# Patient Record
Sex: FEMALE | Race: WHITE | NOT HISPANIC OR LATINO | Employment: FULL TIME | URBAN - NONMETROPOLITAN AREA
[De-identification: names, ages, dates, MRNs, and addresses within clinical notes are randomized per-mention and may not be internally consistent; named-entity substitution may affect disease eponyms.]

---

## 2017-02-13 RX ORDER — ESTRADIOL 10 UG/1
TABLET VAGINAL
Qty: 8 TABLET | Refills: 11 | Status: SHIPPED | OUTPATIENT
Start: 2017-02-13 | End: 2017-12-26 | Stop reason: SDUPTHER

## 2017-11-14 ENCOUNTER — OFFICE VISIT (OUTPATIENT)
Dept: SURGERY | Facility: CLINIC | Age: 47
End: 2017-11-14

## 2017-11-14 VITALS
BODY MASS INDEX: 40.92 KG/M2 | SYSTOLIC BLOOD PRESSURE: 154 MMHG | DIASTOLIC BLOOD PRESSURE: 82 MMHG | WEIGHT: 203 LBS | TEMPERATURE: 97.8 F | HEART RATE: 97 BPM | HEIGHT: 59 IN | OXYGEN SATURATION: 98 %

## 2017-11-14 DIAGNOSIS — L02.412 CUTANEOUS ABSCESS OF LEFT AXILLA: Primary | ICD-10-CM

## 2017-11-14 PROCEDURE — 99244 OFF/OP CNSLTJ NEW/EST MOD 40: CPT | Performed by: SURGERY

## 2017-11-14 RX ORDER — FLUCONAZOLE 150 MG/1
TABLET ORAL
COMMUNITY
Start: 2017-10-10 | End: 2017-11-14

## 2017-11-14 RX ORDER — ESOMEPRAZOLE MAGNESIUM 40 MG/1
CAPSULE, DELAYED RELEASE ORAL
COMMUNITY
Start: 2017-11-13 | End: 2017-11-14

## 2017-11-14 RX ORDER — FLUCONAZOLE 100 MG/1
100 TABLET ORAL DAILY
Qty: 2 TABLET | Refills: 0 | Status: SHIPPED | OUTPATIENT
Start: 2017-11-14 | End: 2017-11-16

## 2017-11-14 RX ORDER — ATORVASTATIN CALCIUM 10 MG/1
10 TABLET, FILM COATED ORAL DAILY
COMMUNITY
End: 2017-11-29

## 2017-11-14 RX ORDER — METHYLPREDNISOLONE 4 MG/1
TABLET ORAL
COMMUNITY
Start: 2017-09-14 | End: 2017-11-14

## 2017-11-14 RX ORDER — HYDROXYZINE HYDROCHLORIDE 25 MG/1
TABLET, FILM COATED ORAL
COMMUNITY
Start: 2017-09-14 | End: 2017-11-14

## 2017-11-14 RX ORDER — SULFAMETHOXAZOLE AND TRIMETHOPRIM 800; 160 MG/1; MG/1
1 TABLET ORAL 2 TIMES DAILY
Qty: 14 TABLET | Refills: 0 | Status: SHIPPED | OUTPATIENT
Start: 2017-11-14 | End: 2017-11-21

## 2017-11-14 RX ORDER — CARVEDILOL 12.5 MG/1
TABLET ORAL
COMMUNITY
Start: 2017-10-08 | End: 2022-01-03 | Stop reason: DRUGHIGH

## 2017-11-14 RX ORDER — FLUTICASONE PROPIONATE 50 MCG
2 SPRAY, SUSPENSION (ML) NASAL DAILY
COMMUNITY

## 2017-11-14 RX ORDER — RANITIDINE 150 MG/1
TABLET ORAL
COMMUNITY
Start: 2017-10-30 | End: 2017-11-14

## 2017-11-14 RX ORDER — QUINAPRIL 40 MG/1
TABLET ORAL
COMMUNITY
Start: 2017-09-29 | End: 2023-01-05 | Stop reason: ALTCHOICE

## 2017-11-14 NOTE — PROGRESS NOTES
Patient: Dilia Mirza    YOB: 1970    Date: 11/14/2017    Primary Care Provider: Prabha Pemberton MD    Chief complaint:   Chief Complaint   Patient presents with   • Mass     abscess in left axilla       Subjective .     History of present illness:  I saw the patient in the office today for an evaluation and treatment of an abscess in left axilla. She states it has been there for the two weeks, it bust open and started draining on 11/06/17 but states it is no longer draining. She states it was a sharp pain in her left axilla but since the abscess has drained, it is no longer painful. She has had previous hidradenitis excision of the left axilla.  This was complicated by postoperative infection according to the patient.    Review of Systems   Constitutional: Negative for chills, fever and unexpected weight change.   HENT: Negative for hearing loss, trouble swallowing and voice change.    Eyes: Negative for visual disturbance.   Respiratory: Negative for apnea, cough, chest tightness, shortness of breath and wheezing.    Cardiovascular: Negative for chest pain, palpitations and leg swelling.   Gastrointestinal: Negative for abdominal distention, abdominal pain, anal bleeding, blood in stool, constipation, diarrhea, nausea, rectal pain and vomiting.   Endocrine: Negative for cold intolerance and heat intolerance.   Genitourinary: Negative for difficulty urinating, dysuria and flank pain.   Musculoskeletal: Negative for back pain and gait problem.   Skin: Negative for color change, rash and wound.   Neurological: Negative for dizziness, syncope, speech difficulty, weakness, light-headedness, numbness and headaches.   Hematological: Negative for adenopathy. Does not bruise/bleed easily.   Psychiatric/Behavioral: Negative for confusion. The patient is not nervous/anxious.        Allergies:  Allergies   Allergen Reactions   • Ceclor [Cefaclor]    • Sudafed [Pseudoephedrine]    •  "Tetracyclines & Related        Medications:    Current Outpatient Prescriptions:   •  atorvastatin (LIPITOR) 10 MG tablet, Take 10 mg by mouth Daily., Disp: , Rfl:   •  carvedilol (COREG) 12.5 MG tablet, , Disp: , Rfl:   •  fluticasone (FLONASE) 50 MCG/ACT nasal spray, 2 sprays into each nostril Daily., Disp: , Rfl:   •  GLIMEPIRIDE PO, Take  by mouth., Disp: , Rfl:   •  metFORMIN (GLUCOPHAGE) 1000 MG tablet, Take 1,000 mg by mouth 2 (Two) Times a Day With Meals., Disp: , Rfl:   •  ONE TOUCH ULTRA TEST test strip, , Disp: , Rfl:   •  Progesterone 40 % cream, , Disp: , Rfl:   •  quinapril (ACCUPRIL) 40 MG tablet, , Disp: , Rfl:   •  YUVAFEM 10 MCG tablet vaginal tablet, INSERT 1 TABLET VAGINALLY TWO TIMES A WEEK , Disp: 8 tablet, Rfl: 11  •  fluconazole (DIFLUCAN) 100 MG tablet, Take 1 tablet by mouth Daily for 2 days., Disp: 2 tablet, Rfl: 0  •  sulfamethoxazole-trimethoprim (BACTRIM DS) 800-160 MG per tablet, Take 1 tablet by mouth 2 (Two) Times a Day for 7 days., Disp: 14 tablet, Rfl: 0    History\"  Past Medical History:   Diagnosis Date   • Diabetes mellitus    • Hypertension        Past Surgical History:   Procedure Laterality Date   • CYST REMOVAL      left axilla   • HAND SURGERY     • HYSTERECTOMY      2007   • KNEE ARTHROSCOPY W/ MENISCAL REPAIR         Family History   Problem Relation Age of Onset   • Diabetes Mother    • Hypertension Mother    • Diabetes Sister    • Hypertension Sister        Social History   Substance Use Topics   • Smoking status: Never Smoker   • Smokeless tobacco: Never Used   • Alcohol use No        Objective     Vital Signs:   /82  Pulse 97  Temp 97.8 °F (36.6 °C) (Temporal Artery )   Ht 59\" (149.9 cm)  Wt 203 lb (92.1 kg)  SpO2 98%  BMI 41 kg/m2    Physical Exam:   General Appearance:    Alert, cooperative, in no acute distress   Head:    Normocephalic, without obvious abnormality, atraumatic   Eyes:            Lids and lashes normal, conjunctivae and sclerae normal, no "   icterus, no pallor, corneas clear, PERRLA   Ears:    Ears appear intact with no abnormalities noted   Throat:   No oral lesions, no thrush, oral mucosa moist   Neck:   No adenopathy, supple, trachea midline, no thyromegaly, no   carotid bruit, no JVD   Lungs:     Clear to auscultation,respirations regular, even and                  unlabored    Heart:    Regular rhythm and normal rate, normal S1 and S2, no            murmur, no gallop, no rub, no click   Chest Wall:    No abnormalities observed   Abdomen:     Normal bowel sounds, no masses, no organomegaly, soft        non-tender, non-distended, no guarding, no rebound                tenderness   Extremities:   Moves all extremities well, no edema, no cyanosis, no             redness   Pulses:   Pulses palpable and equal bilaterally   Skin:   No bleeding, bruising or rash, there is an area of chronic hidradenitis with no active inflammation noted in the left axilla    Lymph nodes:   No palpable adenopathy   Neurologic:   Cranial nerves 2 - 12 grossly intact, sensation intact, DTR       present and equal bilaterally     Results Review:   I reviewed the patient's new clinical results.  I reviewed the patient's new imaging results and agree with the interpretation.    Assessment/Plan     1. Cutaneous abscess of left axilla        In short, did have a detailed and extensive discussion with the patient and her  in the office today and I do think she has chronic hidradenitis of the left axilla.  She needs to finish her antibiotics and then I would like to see her back in the office in 2 weeks.  She may need future excision of this area but only after the inflammatory tissue resolves.  She clearly understands that there is a risk of recurrent hidradenitis with or without surgical intervention.    I discussed the patients findings and my recommendations with patient and family.    Review of Systems was reviewed and confirmed as accurate today.    Electronically  signed by Jeff Bone MD  11/15/17      Scribed for Jeff Bone MD by Pallavi Gunter. 11/15/2017  5:27 PM

## 2017-11-22 ENCOUNTER — TELEPHONE (OUTPATIENT)
Dept: SURGERY | Facility: CLINIC | Age: 47
End: 2017-11-22

## 2017-11-22 ENCOUNTER — OFFICE VISIT (OUTPATIENT)
Dept: SURGERY | Facility: CLINIC | Age: 47
End: 2017-11-22

## 2017-11-22 VITALS
WEIGHT: 203 LBS | DIASTOLIC BLOOD PRESSURE: 80 MMHG | BODY MASS INDEX: 40.92 KG/M2 | SYSTOLIC BLOOD PRESSURE: 136 MMHG | TEMPERATURE: 98 F | HEART RATE: 104 BPM | OXYGEN SATURATION: 98 % | HEIGHT: 59 IN

## 2017-11-22 DIAGNOSIS — L73.2 HIDRADENITIS AXILLARIS: Primary | ICD-10-CM

## 2017-11-22 PROCEDURE — 99213 OFFICE O/P EST LOW 20 MIN: CPT | Performed by: SURGERY

## 2017-11-22 RX ORDER — AMOXICILLIN AND CLAVULANATE POTASSIUM 875; 125 MG/1; MG/1
1 TABLET, FILM COATED ORAL 2 TIMES DAILY
Qty: 14 TABLET | Refills: 0 | Status: SHIPPED | OUTPATIENT
Start: 2017-11-22 | End: 2017-12-02

## 2017-11-22 NOTE — PROGRESS NOTES
"Patient: Dilia Mirza    YOB: 1970    Date: 11/22/2017    Primary Care Provider: Prabha Pemberton MD    Chief Complaint: left axillary pain    History:  I saw the patient in the office today for an evaluation and treatment of an abscess in left axilla. She states it has been there for the three weeks, it did bust open and drain on 11/06/17 but states it is no longer draining. She states there was a sharp pain in her left axilla but since the abscess has drained, it is no longer painful. She has had previous hidradenitis excision of the left axilla.  This was complicated by postoperative infection according to the patient. She states she did finish her antibiotics and the abscess has decreased in size some. She states it has reopened since her last visit here.    Review of Systems   Constitutional: Negative for chills, fatigue and fever.   Respiratory: Negative for cough.    Cardiovascular: Negative for chest pain.   Gastrointestinal: Negative for abdominal pain, diarrhea, nausea and vomiting.       Vital Signs  /80  Pulse 104  Temp 98 °F (36.7 °C) (Temporal Artery )   Ht 59\" (149.9 cm)  Wt 203 lb (92.1 kg)  SpO2 98%  BMI 41 kg/m2    Allergies:  Allergies   Allergen Reactions   • Ceclor [Cefaclor]    • Sudafed [Pseudoephedrine]    • Tetracyclines & Related        Medications:    Current Outpatient Prescriptions:   •  atorvastatin (LIPITOR) 10 MG tablet, Take 10 mg by mouth Daily., Disp: , Rfl:   •  carvedilol (COREG) 12.5 MG tablet, , Disp: , Rfl:   •  fluticasone (FLONASE) 50 MCG/ACT nasal spray, 2 sprays into each nostril Daily., Disp: , Rfl:   •  GLIMEPIRIDE PO, Take  by mouth., Disp: , Rfl:   •  metFORMIN (GLUCOPHAGE) 1000 MG tablet, Take 1,000 mg by mouth 2 (Two) Times a Day With Meals., Disp: , Rfl:   •  ONE TOUCH ULTRA TEST test strip, , Disp: , Rfl:   •  Progesterone 40 % cream, , Disp: , Rfl:   •  quinapril (ACCUPRIL) 40 MG tablet, , Disp: , Rfl:   •  " YUVAFEM 10 MCG tablet vaginal tablet, INSERT 1 TABLET VAGINALLY TWO TIMES A WEEK , Disp: 8 tablet, Rfl: 11    Physical Exam:   General Appearance:    Alert, cooperative, in no acute distress   Head:    Normocephalic, without obvious abnormality, atraumatic   Lungs:     Clear to auscultation,respirations regular, even and                  unlabored    Heart:    Regular rhythm and normal rate, normal S1 and S2, no            murmur, no gallop, no rub, no click   Abdomen:     Normal bowel sounds, no masses, no organomegaly, soft        non-tender, non-distended, no guarding, no rebound                tenderness   Extremities:   Moves all extremities well, no edema, no cyanosis, no             redness   Pulses:   Pulses palpable and equal bilaterally   Skin:   No bleeding, bruising or rash, there is an area of hidradentitis present in the left axilla      Assessment/Plan     1. Hidradenitis axillaris       I did have a detailed and extensive discussion with the patient in the office today and I think that she is going to ultimately need left axillary excision.  She has had this performed in the past for hidradenitis of the left axilla, she did have some postoperative complications of numbness and paresthesia to the anterior aspect of the left forearm.  This has improved but she wants to try to avoid surgical intervention which I think is a good idea.  I did give her another prescription today for Augmentin, I will see her back in the office in one week.    Electronically signed by Jeff Bone MD  11/22/17    Scribed for Jeff Bone MD by Pallavi Gunter. 11/22/2017  1:54 PM

## 2017-11-29 ENCOUNTER — OFFICE VISIT (OUTPATIENT)
Dept: SURGERY | Facility: CLINIC | Age: 47
End: 2017-11-29

## 2017-11-29 VITALS
DIASTOLIC BLOOD PRESSURE: 80 MMHG | HEIGHT: 59 IN | BODY MASS INDEX: 41.37 KG/M2 | TEMPERATURE: 97.8 F | HEART RATE: 93 BPM | OXYGEN SATURATION: 98 % | SYSTOLIC BLOOD PRESSURE: 130 MMHG | WEIGHT: 205.2 LBS

## 2017-11-29 DIAGNOSIS — L73.2 HIDRADENITIS: Primary | ICD-10-CM

## 2017-11-29 PROCEDURE — 99213 OFFICE O/P EST LOW 20 MIN: CPT | Performed by: SURGERY

## 2017-11-29 RX ORDER — ATORVASTATIN CALCIUM 20 MG/1
TABLET, FILM COATED ORAL
Refills: 1 | COMMUNITY
Start: 2017-11-20 | End: 2020-12-31 | Stop reason: DRUGHIGH

## 2017-11-29 NOTE — PROGRESS NOTES
"Patient: Dilia Mirza    YOB: 1970    Date: 11/29/2017    Primary Care Provider: Prabha Pemberton MD    Chief Complaint:   Chief Complaint   Patient presents with   • Post-op     Patient is here for a post op left axilla hidradenitis       History: Patient is here for a post op left axilla hidradenitis.  Patient states that the area has gone down in size.  Patient states that her arm is very sore and tender to touch.  Patient denies any drainage.  Patient states that she has a spot on her right index finger that she wants looked at also patient states that she has had the area for over a year patient states that the area comes and goes.  Patient denies any pain with the finger or growth.    Review of Systems   Constitutional: Negative for chills, fatigue and fever.   HENT: Negative for trouble swallowing.    Respiratory: Negative for cough and shortness of breath.    Cardiovascular: Negative for chest pain.   Gastrointestinal: Negative for abdominal pain, diarrhea, nausea and vomiting.       Vital Signs  /80  Pulse 93  Temp 97.8 °F (36.6 °C)  Ht 59\" (149.9 cm)  Wt 205 lb 3.2 oz (93.1 kg)  SpO2 98%  BMI 41.45 kg/m2    Allergies:  Allergies   Allergen Reactions   • Ceclor [Cefaclor]    • Sudafed [Pseudoephedrine]    • Tetracyclines & Related        Medications:    Current Outpatient Prescriptions:   •  amoxicillin-clavulanate (AUGMENTIN) 875-125 MG per tablet, Take 1 tablet by mouth 2 (Two) Times a Day for 10 days., Disp: 14 tablet, Rfl: 0  •  atorvastatin (LIPITOR) 20 MG tablet, TAKE 1 TABLET BY MOUTH AT BEDTIME, Disp: , Rfl: 1  •  carvedilol (COREG) 12.5 MG tablet, , Disp: , Rfl:   •  fluticasone (FLONASE) 50 MCG/ACT nasal spray, 2 sprays into each nostril Daily., Disp: , Rfl:   •  GLIMEPIRIDE PO, Take  by mouth., Disp: , Rfl:   •  metFORMIN (GLUCOPHAGE) 1000 MG tablet, Take 1,000 mg by mouth 2 (Two) Times a Day With Meals., Disp: , Rfl:   •  ONE TOUCH ULTRA TEST " test strip, , Disp: , Rfl:   •  Progesterone 40 % cream, , Disp: , Rfl:   •  quinapril (ACCUPRIL) 40 MG tablet, , Disp: , Rfl:   •  YUVAFEM 10 MCG tablet vaginal tablet, INSERT 1 TABLET VAGINALLY TWO TIMES A WEEK , Disp: 8 tablet, Rfl: 11    Physical Exam:   General Appearance:    Alert, cooperative, in no acute distress   Head:    Normocephalic, without obvious abnormality, atraumatic   Lungs:     Clear to auscultation,respirations regular, even and                  unlabored    Heart:    Regular rhythm and normal rate, normal S1 and S2, no            murmur, no gallop, no rub, no click   Abdomen:     Normal bowel sounds, no masses, no organomegaly, soft        non-tender, non-distended, no guarding, no rebound                tenderness   Extremities:   Moves all extremities well, no edema, no cyanosis, no             redness   Pulses:   Pulses palpable and equal bilaterally   Skin:   No bleeding, bruising or rash, area of tenderness and hardness in the left axilla markedly decreased      Assessment/Plan     1. Hidradenitis      I did have a detailed and extensive discussion with the patient in the office today.  I am glad that we did not pursue operative intervention, I have asked her to finish her current antibiotics and I would like to see her back in the office in 3 weeks.  Hopefully she will continue to improve, she understands that there is a possibility of worsening.  If so she needs to call me sooner.    Electronically signed by Jeff Bone MD  11/30/17   Scribed for Jeff Bone MD by Sara May. 11/30/2017  4:23 PM

## 2017-12-11 ENCOUNTER — TRANSCRIBE ORDERS (OUTPATIENT)
Dept: MAMMOGRAPHY | Facility: HOSPITAL | Age: 47
End: 2017-12-11

## 2017-12-11 DIAGNOSIS — Z12.39 BREAST CANCER SCREENING: Primary | ICD-10-CM

## 2017-12-13 ENCOUNTER — OFFICE VISIT (OUTPATIENT)
Dept: SURGERY | Facility: CLINIC | Age: 47
End: 2017-12-13

## 2017-12-13 VITALS
TEMPERATURE: 97.9 F | SYSTOLIC BLOOD PRESSURE: 122 MMHG | OXYGEN SATURATION: 97 % | HEIGHT: 59 IN | DIASTOLIC BLOOD PRESSURE: 78 MMHG | BODY MASS INDEX: 41.33 KG/M2 | HEART RATE: 101 BPM | WEIGHT: 205 LBS

## 2017-12-13 DIAGNOSIS — L73.2 HIDRADENITIS AXILLARIS: Primary | ICD-10-CM

## 2017-12-13 PROCEDURE — 99213 OFFICE O/P EST LOW 20 MIN: CPT | Performed by: SURGERY

## 2017-12-13 NOTE — PROGRESS NOTES
"Patient: Dilia Mirza    YOB: 1970    Date: 12/13/2017    Primary Care Provider: Prabha Pemberton MD    Chief Complaint:   Chief Complaint   Patient presents with   • Follow-up     follow up hidradenitis       History: I saw the patient in the office today for evaluation and treatment of hidradenitis in the left axilla. She states that she finished her anabiotics. She states the area has improved and has not flared up since her last visit. She complains of her left axilla being intermittently tender and sore, does not radiate, does not change with movement. She states Dr. Dueñas prescribed her an antiinflammatory drug but she has not yet started it.     The following portions of the patient's history were reviewed and updated as appropriate: allergies, current medications, past family history, past medical history, past social history, past surgical history and problem list.      Review of Systems   Constitutional: Negative for chills, fatigue and fever.   Respiratory: Negative for cough.    Cardiovascular: Negative for chest pain.   Gastrointestinal: Negative for abdominal pain, diarrhea, nausea and vomiting.       Vital Signs  /78  Pulse 101  Temp 97.9 °F (36.6 °C) (Temporal Artery )   Ht 149.9 cm (59.02\")  Wt 93 kg (205 lb)  SpO2 97%  BMI 41.38 kg/m2    Allergies:  Allergies   Allergen Reactions   • Ceclor [Cefaclor]    • Sudafed [Pseudoephedrine]    • Tetracyclines & Related        Medications:    Current Outpatient Prescriptions:   •  atorvastatin (LIPITOR) 20 MG tablet, TAKE 1 TABLET BY MOUTH AT BEDTIME, Disp: , Rfl: 1  •  carvedilol (COREG) 12.5 MG tablet, , Disp: , Rfl:   •  fluticasone (FLONASE) 50 MCG/ACT nasal spray, 2 sprays into each nostril Daily., Disp: , Rfl:   •  GLIMEPIRIDE PO, Take  by mouth., Disp: , Rfl:   •  metFORMIN (GLUCOPHAGE) 1000 MG tablet, Take 1,000 mg by mouth 2 (Two) Times a Day With Meals., Disp: , Rfl:   •  ONE TOUCH ULTRA TEST " test strip, , Disp: , Rfl:   •  Progesterone 40 % cream, , Disp: , Rfl:   •  quinapril (ACCUPRIL) 40 MG tablet, , Disp: , Rfl:   •  YUVAFEM 10 MCG tablet vaginal tablet, INSERT 1 TABLET VAGINALLY TWO TIMES A WEEK , Disp: 8 tablet, Rfl: 11    Physical Exam:   General Appearance:    Alert, cooperative, in no acute distress   Head:    Normocephalic, without obvious abnormality, atraumatic   Lungs:     Clear to auscultation,respirations regular, even and                  unlabored    Heart:    Regular rhythm and normal rate, normal S1 and S2, no            murmur, no gallop, no rub, no click   Abdomen:     Normal bowel sounds, no masses, no organomegaly, soft        non-tender, non-distended, no guarding, no rebound                tenderness   Extremities:   Moves all extremities well, no edema, no cyanosis, no             redness   Pulses:   Pulses palpable and equal bilaterally   Skin:   No bleeding, bruising or rash, area under the left axilla resolved       Results Review:   I reviewed the patient's new clinical results.  I reviewed the patient's new imaging results and agree with the interpretation.     Assessment/Plan     1. Hidradenitis axillaris      I did discuss the situation with the patient today and she does not want surgery and I don't blame her.  I will see her back only if she has further problems.    Electronically signed by Jeff Bone MD  12/13/17  Scribed for Jeff Bone MD by Pallavi Gunter. 12/13/2017  4:49 PM

## 2017-12-26 ENCOUNTER — OFFICE VISIT (OUTPATIENT)
Dept: OBSTETRICS AND GYNECOLOGY | Facility: CLINIC | Age: 47
End: 2017-12-26

## 2017-12-26 VITALS
HEIGHT: 59 IN | WEIGHT: 202 LBS | BODY MASS INDEX: 40.72 KG/M2 | DIASTOLIC BLOOD PRESSURE: 74 MMHG | SYSTOLIC BLOOD PRESSURE: 128 MMHG

## 2017-12-26 DIAGNOSIS — Z78.0 POSTMENOPAUSAL: ICD-10-CM

## 2017-12-26 DIAGNOSIS — Z12.72 SPECIAL SCREENING FOR MALIGNANT NEOPLASM OF VAGINA: ICD-10-CM

## 2017-12-26 DIAGNOSIS — Z01.419 ENCOUNTER FOR GYNECOLOGICAL EXAMINATION WITHOUT ABNORMAL FINDING: Primary | ICD-10-CM

## 2017-12-26 PROCEDURE — 99396 PREV VISIT EST AGE 40-64: CPT | Performed by: PHYSICIAN ASSISTANT

## 2017-12-26 RX ORDER — ESTRADIOL 10 UG/1
1 INSERT VAGINAL 2 TIMES WEEKLY
Qty: 8 TABLET | Refills: 12 | Status: SHIPPED | OUTPATIENT
Start: 2017-12-28 | End: 2018-12-27 | Stop reason: SDUPTHER

## 2017-12-26 NOTE — PROGRESS NOTES
Subjective   Chief Complaint   Patient presents with   • Gynecologic Exam     Previous pap and mammogram done in 2016.       Dilia Mirza is a 47 y.o. year old No obstetric history on file. presenting to be seen for her annual gynecological exam.   She has no complaints  Previous hysterectomy B[facility] 2007-was on evamist until about 2 years ago  She is taking calcium and vit D daily  Has mammogram scheduled [facility] in 2 weeks  Desires refills vagifem   Uses progesterone cream over the counter    Past Medical History:   Diagnosis Date   • Diabetes mellitus    • Hypertension         Current Outpatient Prescriptions:   •  atorvastatin (LIPITOR) 20 MG tablet, TAKE 1 TABLET BY MOUTH AT BEDTIME, Disp: , Rfl: 1  •  carvedilol (COREG) 12.5 MG tablet, , Disp: , Rfl:   •  [START ON 12/28/2017] estradiol (YUVAFEM) 10 MCG tablet vaginal tablet, Insert 1 tablet into the vagina 2 (Two) Times a Week., Disp: 8 tablet, Rfl: 12  •  fluticasone (FLONASE) 50 MCG/ACT nasal spray, 2 sprays into each nostril Daily., Disp: , Rfl:   •  GLIMEPIRIDE PO, Take  by mouth., Disp: , Rfl:   •  metFORMIN (GLUCOPHAGE) 1000 MG tablet, Take 1,000 mg by mouth 2 (Two) Times a Day With Meals., Disp: , Rfl:   •  ONE TOUCH ULTRA TEST test strip, , Disp: , Rfl:   •  Progesterone 40 % cream, , Disp: , Rfl:   •  quinapril (ACCUPRIL) 40 MG tablet, , Disp: , Rfl:    Allergies   Allergen Reactions   • Ceclor [Cefaclor]    • Sudafed [Pseudoephedrine]    • Tetracyclines & Related       Past Surgical History:   Procedure Laterality Date   • CYST REMOVAL      left axilla   • HAND SURGERY     • HYSTERECTOMY      2007   • KNEE ARTHROSCOPY W/ MENISCAL REPAIR        Social History     Social History   • Marital status:      Spouse name: N/A   • Number of children: N/A   • Years of education: N/A     Occupational History   • Not on file.     Social History Main Topics   • Smoking status: Never Smoker   • Smokeless tobacco: Never Used   • Alcohol use No   •  "Drug use: No   • Sexual activity: Defer     Other Topics Concern   • Not on file     Social History Narrative      Family History   Problem Relation Age of Onset   • Diabetes Mother    • Hypertension Mother    • Diabetes Sister    • Hypertension Sister        Review of Systems   Constitutional: Negative.    Gastrointestinal: Negative.    Genitourinary: Negative.            Objective   /74  Ht 149.9 cm (59.02\")  Wt 91.6 kg (202 lb)  BMI 40.78 kg/m2    Physical Exam   Constitutional: She appears well-developed and well-nourished. She is cooperative.   Pulmonary/Chest: Right breast exhibits no inverted nipple, no mass, no nipple discharge, no skin change and no tenderness. Left breast exhibits no inverted nipple, no mass, no nipple discharge, no skin change and no tenderness.   Abdominal: Soft. Normal appearance. There is no hepatosplenomegaly. There is no tenderness.   Genitourinary: Vagina normal and uterus normal. There is no tenderness or lesion on the right labia. There is no tenderness or lesion on the left labia. Cervix exhibits no motion tenderness and no discharge. Right adnexum displays no mass and no tenderness. Left adnexum displays no mass and no tenderness.   Neurological: She is alert.   Skin: Skin is warm and dry.   Psychiatric: She has a normal mood and affect. Her behavior is normal.            Assessment and Plan  Dilia was seen today for gynecologic exam.    Diagnoses and all orders for this visit:    Encounter for gynecological examination without abnormal finding    Special screening for malignant neoplasm of vagina  -     Liquid-based Pap Smear, Screening - ThinPrep Vial, Cervix; Future    Postmenopausal    Other orders  -     estradiol (YUVAFEM) 10 MCG tablet vaginal tablet; Insert 1 tablet into the vagina 2 (Two) Times a Week.      Patient Instructions   Self breast exam monthly  Annual mammogram  Calcium 1200 mg daily and vit D 2000 mg daily  Regular excercise             This note " was electronically signed.    Berta Ortega PA-C   December 26, 2017

## 2018-01-05 DIAGNOSIS — Z12.72 SPECIAL SCREENING FOR MALIGNANT NEOPLASM OF VAGINA: ICD-10-CM

## 2018-01-12 ENCOUNTER — HOSPITAL ENCOUNTER (OUTPATIENT)
Dept: MAMMOGRAPHY | Facility: HOSPITAL | Age: 48
Discharge: HOME | End: 2018-01-12
Admitting: PHYSICIAN ASSISTANT

## 2018-01-12 DIAGNOSIS — Z12.39 BREAST CANCER SCREENING: ICD-10-CM

## 2018-01-12 PROCEDURE — 77067 SCR MAMMO BI INCL CAD: CPT

## 2018-01-12 PROCEDURE — 77063 BREAST TOMOSYNTHESIS BI: CPT

## 2018-01-16 DIAGNOSIS — R92.8 ABNORMALITY OF RIGHT BREAST ON SCREENING MAMMOGRAM: Primary | ICD-10-CM

## 2018-02-06 ENCOUNTER — HOSPITAL ENCOUNTER (OUTPATIENT)
Dept: MAMMOGRAPHY | Facility: HOSPITAL | Age: 48
Discharge: HOME | End: 2018-02-06
Admitting: PHYSICIAN ASSISTANT

## 2018-02-06 ENCOUNTER — HOSPITAL ENCOUNTER (OUTPATIENT)
Dept: ULTRASOUND IMAGING | Facility: HOSPITAL | Age: 48
Discharge: HOME | End: 2018-02-06

## 2018-02-06 DIAGNOSIS — R92.8 ABNORMALITY OF RIGHT BREAST ON SCREENING MAMMOGRAM: ICD-10-CM

## 2018-02-06 PROCEDURE — 76642 ULTRASOUND BREAST LIMITED: CPT

## 2018-02-06 PROCEDURE — G0279 TOMOSYNTHESIS, MAMMO: HCPCS

## 2018-02-06 PROCEDURE — 77065 DX MAMMO INCL CAD UNI: CPT

## 2018-08-30 ENCOUNTER — OFFICE VISIT (OUTPATIENT)
Dept: OBSTETRICS AND GYNECOLOGY | Facility: CLINIC | Age: 48
End: 2018-08-30

## 2018-08-30 VITALS
DIASTOLIC BLOOD PRESSURE: 72 MMHG | WEIGHT: 197.6 LBS | BODY MASS INDEX: 39.84 KG/M2 | SYSTOLIC BLOOD PRESSURE: 116 MMHG | HEIGHT: 59 IN

## 2018-08-30 DIAGNOSIS — N64.4 BREAST PAIN, RIGHT: Primary | ICD-10-CM

## 2018-08-30 PROCEDURE — 99213 OFFICE O/P EST LOW 20 MIN: CPT | Performed by: PHYSICIAN ASSISTANT

## 2018-08-30 RX ORDER — ESTRADIOL 10 UG/1
INSERT VAGINAL
COMMUNITY
End: 2020-11-25

## 2018-08-30 RX ORDER — ESOMEPRAZOLE MAGNESIUM 40 MG/1
CAPSULE, DELAYED RELEASE ORAL
Refills: 0 | COMMUNITY
Start: 2018-08-01

## 2018-08-30 RX ORDER — LORATADINE 10 MG/1
10 TABLET ORAL DAILY
Refills: 0 | COMMUNITY
Start: 2018-06-14 | End: 2020-12-31

## 2018-08-30 RX ORDER — METFORMIN HYDROCHLORIDE 500 MG/1
TABLET, EXTENDED RELEASE ORAL
Refills: 2 | COMMUNITY
Start: 2018-08-04

## 2018-08-30 RX ORDER — ASCORBIC ACID
CRYSTALS ORAL
COMMUNITY

## 2018-08-30 RX ORDER — MULTIVIT-MIN/IRON/FOLIC ACID/K 18-600-40
CAPSULE ORAL
COMMUNITY

## 2018-08-30 RX ORDER — ATORVASTATIN CALCIUM 40 MG/1
40 TABLET, FILM COATED ORAL DAILY
Refills: 1 | COMMUNITY
Start: 2018-06-01 | End: 2020-12-31 | Stop reason: SDUPTHER

## 2018-08-30 RX ORDER — RANITIDINE 150 MG/1
150 TABLET ORAL 2 TIMES DAILY
Refills: 0 | COMMUNITY
Start: 2018-08-01 | End: 2020-12-31

## 2018-08-30 RX ORDER — GLIMEPIRIDE 1 MG/1
TABLET ORAL
Refills: 2 | COMMUNITY
Start: 2018-08-27 | End: 2020-12-31 | Stop reason: SDUPTHER

## 2018-12-27 ENCOUNTER — OFFICE VISIT (OUTPATIENT)
Dept: OBSTETRICS AND GYNECOLOGY | Facility: CLINIC | Age: 48
End: 2018-12-27

## 2018-12-27 VITALS
SYSTOLIC BLOOD PRESSURE: 128 MMHG | HEIGHT: 60 IN | BODY MASS INDEX: 40.37 KG/M2 | WEIGHT: 205.6 LBS | DIASTOLIC BLOOD PRESSURE: 78 MMHG

## 2018-12-27 DIAGNOSIS — Z12.72 SPECIAL SCREENING FOR MALIGNANT NEOPLASM OF VAGINA: ICD-10-CM

## 2018-12-27 DIAGNOSIS — Z01.419 ENCOUNTER FOR GYNECOLOGICAL EXAMINATION WITHOUT ABNORMAL FINDING: Primary | ICD-10-CM

## 2018-12-27 DIAGNOSIS — N95.1 MENOPAUSAL STATE: ICD-10-CM

## 2018-12-27 PROCEDURE — 99396 PREV VISIT EST AGE 40-64: CPT | Performed by: PHYSICIAN ASSISTANT

## 2018-12-27 RX ORDER — ATORVASTATIN CALCIUM 40 MG/1
TABLET, FILM COATED ORAL
COMMUNITY

## 2018-12-27 RX ORDER — ESTRADIOL 10 UG/1
1 INSERT VAGINAL 2 TIMES WEEKLY
Qty: 8 TABLET | Refills: 12 | Status: SHIPPED | OUTPATIENT
Start: 2018-12-27 | End: 2020-01-07

## 2018-12-27 RX ORDER — BLOOD-GLUCOSE METER
KIT MISCELLANEOUS
Refills: 0 | COMMUNITY
Start: 2018-10-04

## 2018-12-27 RX ORDER — ESTRADIOL 2 MG/1
2 TABLET ORAL DAILY
Qty: 30 TABLET | Refills: 11 | Status: SHIPPED | OUTPATIENT
Start: 2018-12-27 | End: 2018-12-27

## 2018-12-27 NOTE — PROGRESS NOTES
Subjective   Chief Complaint   Patient presents with   • Gynecologic Exam     Pap is due; MMG is due in January; No complaints       Dilia Mirza is a 48 y.o. year old  presenting to be seen for her annual gynecological exam.   She has no complaints or concerns  Desires refills Yuvafem  Previous hysterectomy BSO  Screening mammogram is due in January    Past Medical History:   Diagnosis Date   • Diabetes mellitus (CMS/HCC)    • Hypertension         Current Outpatient Medications:   •  Adalimumab (HUMIRA) 10 MG/0.1ML Prefilled Syringe Kit, Humira(CF) 10 mg/0.1 mL subcutaneous syringe kit  Inject by subcutaneous route., Disp: , Rfl:   •  Ascorbic Acid (VITAMIN C) 500 MG capsule, Vitamin C  1 daily, Disp: , Rfl:   •  atorvastatin (LIPITOR) 20 MG tablet, TAKE 1 TABLET BY MOUTH AT BEDTIME, Disp: , Rfl: 1  •  Blood Glucose Monitoring Suppl (ONE TOUCH ULTRA MINI) w/Device kit, USE TO CHECK BLOOD GLUCOSE ONCE DAILY OR AS DIRECTED, Disp: , Rfl: 0  •  carvedilol (COREG) 12.5 MG tablet, , Disp: , Rfl:   •  Cholecalciferol (VITAMIN D3) 1000 UNIT/SPRAY liquid, Vitamin D3  Daily, Disp: , Rfl:   •  esomeprazole (nexIUM) 40 MG capsule, TAKE 1 CAPSULE BY MOUTH ONE TIME A DAY, Disp: , Rfl: 0  •  estradiol (VAGIFEM) 10 MCG tablet vaginal tablet, Vagifem 10 mcg vaginal tablet  As Directed, Disp: , Rfl:   •  estradiol (YUVAFEM) 10 MCG tablet vaginal tablet, Insert 1 tablet into the vagina 2 (Two) Times a Week., Disp: 8 tablet, Rfl: 12  •  fluticasone (FLONASE) 50 MCG/ACT nasal spray, 2 sprays into each nostril Daily., Disp: , Rfl:   •  glimepiride (AMARYL) 1 MG tablet, TAKE 1/2 TABLET BY MOUTH IN THE MORNING, Disp: , Rfl: 2  •  GLIMEPIRIDE PO, Take  by mouth., Disp: , Rfl:   •  glucose blood test strip, OneTouch Ultra Blue Test Strip  1 time daily E11.8, Disp: , Rfl:   •  metFORMIN (GLUCOPHAGE) 1000 MG tablet, Take 1,000 mg by mouth 2 (Two) Times a Day With Meals., Disp: , Rfl:   •  ONE TOUCH ULTRA TEST test strip,  , Disp: , Rfl:   •  Progesterone 40 % cream, , Disp: , Rfl:   •  Progesterone Micronized (PROGESTERONE PO), progesterone  As Directed, Disp: , Rfl:   •  quinapril (ACCUPRIL) 40 MG tablet, , Disp: , Rfl:   •  Vitamin E 200 units tablet, vitamin E  Daily, Disp: , Rfl:   •  atorvastatin (LIPITOR) 40 MG tablet, Take 40 mg by mouth Daily. 200001, Disp: , Rfl: 1  •  atorvastatin (LIPITOR) 40 MG tablet, atorvastatin 40 mg tablet  Take 1 tablet every day by oral route., Disp: , Rfl:   •  B Complex Vitamins (GNP VITAMIN B COMPLEX PO), vitamin B complex  1 daily, Disp: , Rfl:   •  Dermatological Products, Misc. (ALEVICYN ANTIPRURITIC EX), Alevicyn Antipruritic  prn, Disp: , Rfl:   •  loratadine (CLARITIN) 10 MG tablet, Take 10 mg by mouth Daily. 200001, Disp: , Rfl: 0  •  metFORMIN ER (GLUCOPHAGE-XR) 500 MG 24 hr tablet, TAKE 2 TABLETS BY MOUTH TWO TIMES A DAY in the morning and evening, Disp: , Rfl: 2  •  raNITIdine (ZANTAC) 150 MG tablet, Take 150 mg by mouth 2 (Two) Times a Day., Disp: , Rfl: 0   Allergies   Allergen Reactions   • Ceclor [Cefaclor] Anaphylaxis   • Tetracycline Hives   • Tetracyclines & Related Anaphylaxis   • Sudafed [Pseudoephedrine] Palpitations      Past Surgical History:   Procedure Laterality Date   • CYST REMOVAL      left axilla   • HAND SURGERY     • HYSTERECTOMY      2007   • KNEE ARTHROSCOPY W/ MENISCAL REPAIR     • OOPHORECTOMY     • TOTAL ABDOMINAL HYSTERECTOMY        Social History     Socioeconomic History   • Marital status:      Spouse name: Not on file   • Number of children: Not on file   • Years of education: Not on file   • Highest education level: Not on file   Social Needs   • Financial resource strain: Not on file   • Food insecurity - worry: Not on file   • Food insecurity - inability: Not on file   • Transportation needs - medical: Not on file   • Transportation needs - non-medical: Not on file   Occupational History   • Not on file   Tobacco Use   • Smoking status: Never  "Smoker   • Smokeless tobacco: Never Used   Substance and Sexual Activity   • Alcohol use: No   • Drug use: No   • Sexual activity: Yes     Partners: Male     Birth control/protection: Surgical   Other Topics Concern   • Not on file   Social History Narrative   • Not on file      Family History   Problem Relation Age of Onset   • Diabetes Mother    • Hypertension Mother    • Diabetes Sister    • Hypertension Sister        Review of Systems   Constitutional: Negative.    Gastrointestinal: Negative.    Genitourinary: Negative.            Objective   /78   Ht 152.4 cm (60\")   Wt 93.3 kg (205 lb 9.6 oz)   Breastfeeding? No   BMI 40.15 kg/m²     Physical Exam   Constitutional: She appears well-developed and well-nourished. She is cooperative.   Pulmonary/Chest: Right breast exhibits no inverted nipple, no mass, no nipple discharge, no skin change and no tenderness. Left breast exhibits no inverted nipple, no mass, no nipple discharge, no skin change and no tenderness.   Abdominal: Soft. Normal appearance. There is no tenderness.   Genitourinary: Vagina normal. There is no tenderness or lesion on the right labia. There is no tenderness or lesion on the left labia. Right adnexum displays no mass and no tenderness. Left adnexum displays no mass and no tenderness.   Genitourinary Comments: Pap done   Neurological: She is alert.   Skin: Skin is warm and dry.   Psychiatric: She has a normal mood and affect. Her behavior is normal.            Assessment and Plan  Dilia was seen today for gynecologic exam.    Diagnoses and all orders for this visit:    Encounter for gynecological examination without abnormal finding  -     Mammo Screening Digital Tomosynthesis Bilateral With CAD; Future    Special screening for malignant neoplasm of vagina  -     Liquid-based Pap Smear, Screening; Future    Menopausal state    Other orders  -     estradiol (YUVAFEM) 10 MCG tablet vaginal tablet; Insert 1 tablet into the vagina 2 (Two) " Times a Week.  -     Discontinue: estradiol (ESTRACE) 2 MG tablet; Take 1 tablet by mouth Daily.      Patient Instructions   Self breast exam monthly  Annual mammogram  Calcium 1200 mg daily and vit D 2000 mg daily  Regular excercise             This note was electronically signed.    Berta Ortega PA-C   December 27, 2018

## 2019-01-07 DIAGNOSIS — Z12.72 SPECIAL SCREENING FOR MALIGNANT NEOPLASM OF VAGINA: ICD-10-CM

## 2019-02-08 ENCOUNTER — HOSPITAL ENCOUNTER (OUTPATIENT)
Dept: MAMMOGRAPHY | Facility: HOSPITAL | Age: 49
Discharge: HOME | End: 2019-02-08
Admitting: PHYSICIAN ASSISTANT

## 2019-02-08 DIAGNOSIS — Z01.419 ENCOUNTER FOR GYNECOLOGICAL EXAMINATION WITHOUT ABNORMAL FINDING: ICD-10-CM

## 2019-02-08 PROCEDURE — 77067 SCR MAMMO BI INCL CAD: CPT

## 2019-02-08 PROCEDURE — 77063 BREAST TOMOSYNTHESIS BI: CPT

## 2019-02-11 ENCOUNTER — TELEPHONE (OUTPATIENT)
Dept: OBSTETRICS AND GYNECOLOGY | Facility: CLINIC | Age: 49
End: 2019-02-11

## 2019-02-11 DIAGNOSIS — R92.8 ABNORMAL MAMMOGRAM: Primary | ICD-10-CM

## 2019-02-11 NOTE — TELEPHONE ENCOUNTER
----- Message from Berta Ortega PA-C sent at 2/9/2019  8:37 AM EST -----  Please check to see if patient was scheduled for additional views. Thanks

## 2019-02-14 ENCOUNTER — HOSPITAL ENCOUNTER (OUTPATIENT)
Dept: MAMMOGRAPHY | Facility: HOSPITAL | Age: 49
Discharge: HOME | End: 2019-02-14
Admitting: PHYSICIAN ASSISTANT

## 2019-02-14 DIAGNOSIS — R92.8 ABNORMAL MAMMOGRAM: ICD-10-CM

## 2019-02-14 PROCEDURE — 77065 DX MAMMO INCL CAD UNI: CPT

## 2019-02-14 PROCEDURE — G0279 TOMOSYNTHESIS, MAMMO: HCPCS

## 2020-01-07 RX ORDER — ESTRADIOL 10 UG/1
INSERT VAGINAL
Qty: 8 TABLET | Refills: 11 | Status: SHIPPED | OUTPATIENT
Start: 2020-01-07 | End: 2020-12-31 | Stop reason: SDUPTHER

## 2020-01-27 ENCOUNTER — TRANSCRIBE ORDERS (OUTPATIENT)
Dept: ADMINISTRATIVE | Facility: HOSPITAL | Age: 50
End: 2020-01-27

## 2020-01-27 DIAGNOSIS — Z12.31 BREAST CANCER SCREENING BY MAMMOGRAM: Primary | ICD-10-CM

## 2020-03-10 ENCOUNTER — APPOINTMENT (OUTPATIENT)
Dept: MAMMOGRAPHY | Facility: HOSPITAL | Age: 50
End: 2020-03-10

## 2020-04-06 ENCOUNTER — APPOINTMENT (OUTPATIENT)
Dept: MAMMOGRAPHY | Facility: HOSPITAL | Age: 50
End: 2020-04-06

## 2020-06-03 ENCOUNTER — TRANSCRIBE ORDERS (OUTPATIENT)
Dept: LAB | Facility: HOSPITAL | Age: 50
End: 2020-06-03

## 2020-06-03 ENCOUNTER — LAB (OUTPATIENT)
Dept: LAB | Facility: HOSPITAL | Age: 50
End: 2020-06-03

## 2020-06-03 DIAGNOSIS — L40.0 PSORIASIS VULGARIS: Primary | ICD-10-CM

## 2020-06-03 DIAGNOSIS — L40.1 PSEUDOPSORIASIS, PUSTULAR: ICD-10-CM

## 2020-06-03 DIAGNOSIS — L40.0 PSORIASIS VULGARIS: ICD-10-CM

## 2020-06-03 LAB
ALBUMIN SERPL-MCNC: 4.3 G/DL (ref 3.5–5.2)
ALBUMIN/GLOB SERPL: 1.7 G/DL
ALP SERPL-CCNC: 65 U/L (ref 39–117)
ALT SERPL W P-5'-P-CCNC: 34 U/L (ref 1–33)
ANION GAP SERPL CALCULATED.3IONS-SCNC: 11.5 MMOL/L (ref 5–15)
AST SERPL-CCNC: 25 U/L (ref 1–32)
BASOPHILS # BLD AUTO: 0.03 10*3/MM3 (ref 0–0.2)
BASOPHILS NFR BLD AUTO: 0.8 % (ref 0–1.5)
BILIRUB CONJ SERPL-MCNC: <0.2 MG/DL (ref 0.2–0.3)
BILIRUB SERPL-MCNC: 0.3 MG/DL (ref 0.2–1.2)
BUN BLD-MCNC: 17 MG/DL (ref 6–20)
BUN/CREAT SERPL: 35.4 (ref 7–25)
CALCIUM SPEC-SCNC: 9.2 MG/DL (ref 8.6–10.5)
CHLORIDE SERPL-SCNC: 102 MMOL/L (ref 98–107)
CO2 SERPL-SCNC: 23.5 MMOL/L (ref 22–29)
CREAT BLD-MCNC: 0.48 MG/DL (ref 0.57–1)
DEPRECATED RDW RBC AUTO: 44.7 FL (ref 37–54)
EOSINOPHIL # BLD AUTO: 0.13 10*3/MM3 (ref 0–0.4)
EOSINOPHIL NFR BLD AUTO: 3.4 % (ref 0.3–6.2)
ERYTHROCYTE [DISTWIDTH] IN BLOOD BY AUTOMATED COUNT: 13.8 % (ref 12.3–15.4)
GFR SERPL CREATININE-BSD FRML MDRD: 136 ML/MIN/1.73
GLOBULIN UR ELPH-MCNC: 2.6 GM/DL
GLUCOSE BLD-MCNC: 190 MG/DL (ref 65–99)
HCT VFR BLD AUTO: 36.4 % (ref 34–46.6)
HGB BLD-MCNC: 12.3 G/DL (ref 12–15.9)
IMM GRANULOCYTES # BLD AUTO: 0 10*3/MM3 (ref 0–0.05)
IMM GRANULOCYTES NFR BLD AUTO: 0 % (ref 0–0.5)
LYMPHOCYTES # BLD AUTO: 1.22 10*3/MM3 (ref 0.7–3.1)
LYMPHOCYTES NFR BLD AUTO: 32.2 % (ref 19.6–45.3)
MCH RBC QN AUTO: 29.9 PG (ref 26.6–33)
MCHC RBC AUTO-ENTMCNC: 33.8 G/DL (ref 31.5–35.7)
MCV RBC AUTO: 88.6 FL (ref 79–97)
MONOCYTES # BLD AUTO: 0.25 10*3/MM3 (ref 0.1–0.9)
MONOCYTES NFR BLD AUTO: 6.6 % (ref 5–12)
NEUTROPHILS # BLD AUTO: 2.16 10*3/MM3 (ref 1.7–7)
NEUTROPHILS NFR BLD AUTO: 57 % (ref 42.7–76)
NRBC BLD AUTO-RTO: 0 /100 WBC (ref 0–0.2)
PLATELET # BLD AUTO: 168 10*3/MM3 (ref 140–450)
PMV BLD AUTO: 11.4 FL (ref 6–12)
POTASSIUM BLD-SCNC: 3.9 MMOL/L (ref 3.5–5.2)
PROT SERPL-MCNC: 6.9 G/DL (ref 6–8.5)
RBC # BLD AUTO: 4.11 10*6/MM3 (ref 3.77–5.28)
SODIUM BLD-SCNC: 137 MMOL/L (ref 136–145)
WBC NRBC COR # BLD: 3.79 10*3/MM3 (ref 3.4–10.8)

## 2020-06-03 PROCEDURE — 85025 COMPLETE CBC W/AUTO DIFF WBC: CPT

## 2020-06-03 PROCEDURE — 36415 COLL VENOUS BLD VENIPUNCTURE: CPT

## 2020-06-03 PROCEDURE — 80053 COMPREHEN METABOLIC PANEL: CPT

## 2020-06-03 PROCEDURE — 82248 BILIRUBIN DIRECT: CPT

## 2020-11-18 RX ORDER — ESTRADIOL 10 UG/1
INSERT VAGINAL
Qty: 8 TABLET | Refills: 0 | OUTPATIENT
Start: 2020-11-18

## 2020-11-25 RX ORDER — ESTRADIOL 10 UG/1
1 INSERT VAGINAL 2 TIMES WEEKLY
Qty: 8 TABLET | Refills: 0 | Status: SHIPPED | OUTPATIENT
Start: 2020-11-26 | End: 2020-12-31 | Stop reason: SDUPTHER

## 2020-11-25 NOTE — TELEPHONE ENCOUNTER
----- Message from Jessica Patel sent at 11/25/2020  9:18 AM EST -----  Regarding: REFILL REQUEST  Contact: PT  PT IS SCHEDULED FOR ANNUAL WITH GIAN[facility]ANCO ON 12/31/20.  PLEASE SEND REFILLS OF VAGIFEM TO [facility].  THANKS

## 2020-12-31 ENCOUNTER — OFFICE VISIT (OUTPATIENT)
Dept: OBSTETRICS AND GYNECOLOGY | Facility: CLINIC | Age: 50
End: 2020-12-31

## 2020-12-31 VITALS
BODY MASS INDEX: 37.85 KG/M2 | SYSTOLIC BLOOD PRESSURE: 120 MMHG | DIASTOLIC BLOOD PRESSURE: 70 MMHG | WEIGHT: 192.8 LBS | HEIGHT: 60 IN

## 2020-12-31 DIAGNOSIS — Z12.31 ENCOUNTER FOR SCREENING MAMMOGRAM FOR MALIGNANT NEOPLASM OF BREAST: ICD-10-CM

## 2020-12-31 DIAGNOSIS — Z12.72 VAGINAL PAP SMEAR: ICD-10-CM

## 2020-12-31 DIAGNOSIS — Z01.419 ENCOUNTER FOR GYNECOLOGICAL EXAMINATION WITHOUT ABNORMAL FINDING: Primary | ICD-10-CM

## 2020-12-31 PROCEDURE — 99396 PREV VISIT EST AGE 40-64: CPT | Performed by: PHYSICIAN ASSISTANT

## 2020-12-31 RX ORDER — RISANKIZUMAB-RZAA 75 MG/0.83
KIT SUBCUTANEOUS
COMMUNITY
Start: 2020-09-28 | End: 2022-01-03

## 2020-12-31 RX ORDER — DULAGLUTIDE 1.5 MG/.5ML
INJECTION, SOLUTION SUBCUTANEOUS
COMMUNITY
Start: 2020-12-27

## 2020-12-31 RX ORDER — GLIMEPIRIDE 1 MG/1
TABLET ORAL
COMMUNITY
End: 2020-12-31 | Stop reason: ALTCHOICE

## 2020-12-31 RX ORDER — ESTRADIOL 10 UG/1
1 INSERT VAGINAL 2 TIMES WEEKLY
Qty: 8 TABLET | Refills: 12 | Status: SHIPPED | OUTPATIENT
Start: 2020-12-31 | End: 2021-05-03 | Stop reason: SDUPTHER

## 2020-12-31 NOTE — PROGRESS NOTES
Subjective   Chief Complaint   Patient presents with   • Gynecologic Exam     Last pap done 18-WNL, MMG is due, No complaints       Dilia Mirza is a 50 y.o. year old  presenting to be seen for her annual gynecological exam.   She has no complaints or concerns.  She desires refills on her Vagifem.  She has had previous hysterectomy and bilateral salpingo-oophorectomy.  Her last screening mammogram was 2 years ago and she would like to schedule this at [facility].    Past Medical History:   Diagnosis Date   • Diabetes mellitus (CMS/HCC)    • Hypertension         Current Outpatient Medications:   •  Ascorbic Acid (VITAMIN C) 500 MG capsule, Vitamin C  1 daily, Disp: , Rfl:   •  atorvastatin (LIPITOR) 40 MG tablet, atorvastatin 40 mg tablet  Take 1 tablet every day by oral route., Disp: , Rfl:   •  B Complex Vitamins (GNP VITAMIN B COMPLEX PO), vitamin B complex  1 daily, Disp: , Rfl:   •  Blood Glucose Monitoring Suppl (ONE TOUCH ULTRA MINI) w/Device kit, USE TO CHECK BLOOD GLUCOSE ONCE DAILY OR AS DIRECTED, Disp: , Rfl: 0  •  carvedilol (COREG) 12.5 MG tablet, , Disp: , Rfl:   •  Cholecalciferol (VITAMIN D3) 1000 UNIT/SPRAY liquid, Vitamin D3  Daily, Disp: , Rfl:   •  Dermatological Products, Misc. (ALEVICYN ANTIPRURITIC EX), Alevicyn Antipruritic  prn, Disp: , Rfl:   •  esomeprazole (nexIUM) 40 MG capsule, TAKE 1 CAPSULE BY MOUTH ONE TIME A DAY, Disp: , Rfl: 0  •  estradiol (Vagifem) 10 MCG tablet vaginal tablet, Insert 1 tablet into the vagina 2 (Two) Times a Week., Disp: 8 tablet, Rfl: 12  •  fluticasone (FLONASE) 50 MCG/ACT nasal spray, 2 sprays into each nostril Daily., Disp: , Rfl:   •  glucose blood test strip, OneTouch Ultra Blue Test Strip  1 time daily E11.8, Disp: , Rfl:   •  metFORMIN (GLUCOPHAGE) 1000 MG tablet, Take 1,000 mg by mouth 2 (Two) Times a Day With Meals., Disp: , Rfl:   •  metFORMIN ER (GLUCOPHAGE-XR) 500 MG 24 hr tablet, TAKE 2 TABLETS BY MOUTH TWO  "TIMES A DAY in the morning and evening, Disp: , Rfl: 2  •  ONE TOUCH ULTRA TEST test strip, , Disp: , Rfl:   •  Progesterone 40 % cream, , Disp: , Rfl:   •  quinapril (ACCUPRIL) 40 MG tablet, , Disp: , Rfl:   •  Skyrizi, 150 MG Dose, 75 MG/0.83ML Prefilled Syringe Kit kit, , Disp: , Rfl:   •  Trulicity 1.5 MG/0.5ML solution pen-injector, INJECT THE CONTENTS OF 1 SYRINGE (1.5MG) ONE TIME A WEEK AS DIRECTED., Disp: , Rfl:   •  Vitamin E 200 units tablet, vitamin E  Daily, Disp: , Rfl:    Allergies   Allergen Reactions   • Ceclor [Cefaclor] Anaphylaxis   • Tetracycline Hives   • Tetracyclines & Related Anaphylaxis   • Sudafed [Pseudoephedrine] Palpitations      Past Surgical History:   Procedure Laterality Date   • CYST REMOVAL      left axilla   • HAND SURGERY     • HYSTERECTOMY      2007   • KNEE ARTHROSCOPY W/ MENISCAL REPAIR     • OOPHORECTOMY     • TOTAL ABDOMINAL HYSTERECTOMY        Social History     Socioeconomic History   • Marital status:      Spouse name: Not on file   • Number of children: Not on file   • Years of education: Not on file   • Highest education level: Not on file   Tobacco Use   • Smoking status: Never Smoker   • Smokeless tobacco: Never Used   Substance and Sexual Activity   • Alcohol use: No   • Drug use: No   • Sexual activity: Yes     Partners: Male     Birth control/protection: Surgical      Family History   Problem Relation Age of Onset   • Diabetes Mother    • Hypertension Mother    • Diabetes Sister    • Hypertension Sister        Review of Systems   Constitutional: Negative for chills, diaphoresis and fever.   Gastrointestinal: Negative for abdominal pain, constipation, diarrhea, nausea and vomiting.   Genitourinary: Negative for difficulty urinating, dysuria, menstrual problem, pelvic pain, vaginal bleeding and vaginal discharge.   All other systems reviewed and are negative.          Objective   /70   Ht 152.4 cm (60\")   Wt 87.5 kg (192 lb 12.8 oz)   Breastfeeding No "   BMI 37.65 kg/m²     Physical Exam  Constitutional:       Appearance: Normal appearance. She is well-groomed. She is obese.   Eyes:      General: Lids are normal.      Extraocular Movements: Extraocular movements intact.      Conjunctiva/sclera: Conjunctivae normal.   Chest:      Breasts: Breasts are symmetrical.         Right: No inverted nipple, mass, nipple discharge, skin change or tenderness.         Left: No inverted nipple, mass, nipple discharge, skin change or tenderness.   Abdominal:      Palpations: Abdomen is soft.      Tenderness: There is no abdominal tenderness. There is no guarding.   Genitourinary:     Labia:         Right: No rash, tenderness or lesion.         Left: No rash, tenderness or lesion.       Vagina: No vaginal discharge, erythema, tenderness, bleeding or lesions.      Uterus: Absent.       Adnexa:         Right: No mass or tenderness.          Left: No mass or tenderness.        Comments: Pap done  Musculoskeletal: Normal range of motion.   Skin:     General: Skin is warm and dry.      Findings: No lesion or rash.   Neurological:      General: No focal deficit present.      Mental Status: She is alert and oriented to person, place, and time.   Psychiatric:         Attention and Perception: Attention normal.         Mood and Affect: Mood normal.         Speech: Speech normal.         Behavior: Behavior is cooperative.         Thought Content: Thought content normal.              Assessment and Plan  Diagnoses and all orders for this visit:    1. Encounter for gynecological examination without abnormal finding (Primary)    2. Vaginal Pap smear  -     Liquid-based Pap Smear, Screening; Future    3. Encounter for screening mammogram for malignant neoplasm of breast  -     Mammo Screening Digital Tomosynthesis Bilateral With CAD; Future    Other orders  -     estradiol (Vagifem) 10 MCG tablet vaginal tablet; Insert 1 tablet into the vagina 2 (Two) Times a Week.  Dispense: 8 tablet; Refill:  12      Patient Instructions   Self breast exam monthly  Annual mammogram  Calcium 1200 mg daily and vit D 2000 mg daily  Regular excercise             This note was electronically signed.    Berta Ortega PA-C   December 31, 2020

## 2021-01-08 DIAGNOSIS — Z12.72 VAGINAL PAP SMEAR: ICD-10-CM

## 2021-01-08 RX ORDER — FLUCONAZOLE 150 MG/1
TABLET ORAL
Qty: 2 TABLET | Refills: 0 | Status: SHIPPED | OUTPATIENT
Start: 2021-01-08 | End: 2022-01-03

## 2021-02-16 ENCOUNTER — HOSPITAL ENCOUNTER (OUTPATIENT)
Dept: MAMMOGRAPHY | Facility: HOSPITAL | Age: 51
End: 2021-02-16

## 2021-03-31 ENCOUNTER — APPOINTMENT (OUTPATIENT)
Dept: MAMMOGRAPHY | Facility: HOSPITAL | Age: 51
End: 2021-03-31

## 2021-04-07 ENCOUNTER — TRANSCRIBE ORDERS (OUTPATIENT)
Dept: GENERAL RADIOLOGY | Facility: HOSPITAL | Age: 51
End: 2021-04-07

## 2021-04-07 ENCOUNTER — HOSPITAL ENCOUNTER (OUTPATIENT)
Dept: GENERAL RADIOLOGY | Facility: HOSPITAL | Age: 51
Discharge: HOME | End: 2021-04-07
Admitting: NURSE PRACTITIONER

## 2021-04-07 DIAGNOSIS — G62.9 ACQUIRED POLYNEUROPATHY: ICD-10-CM

## 2021-04-07 DIAGNOSIS — M25.551 RIGHT HIP PAIN: ICD-10-CM

## 2021-04-07 DIAGNOSIS — M25.552 LEFT HIP PAIN: Primary | ICD-10-CM

## 2021-04-07 DIAGNOSIS — M25.552 LEFT HIP PAIN: ICD-10-CM

## 2021-04-07 PROCEDURE — 73522 X-RAY EXAM HIPS BI 3-4 VIEWS: CPT

## 2021-04-07 PROCEDURE — 72100 X-RAY EXAM L-S SPINE 2/3 VWS: CPT

## 2021-05-03 ENCOUNTER — TELEPHONE (OUTPATIENT)
Dept: OBSTETRICS AND GYNECOLOGY | Facility: CLINIC | Age: 51
End: 2021-05-03

## 2021-05-03 RX ORDER — ESTRADIOL 10 UG/1
1 INSERT VAGINAL 2 TIMES WEEKLY
Qty: 8 TABLET | Refills: 12 | Status: SHIPPED | OUTPATIENT
Start: 2021-05-03 | End: 2022-01-03 | Stop reason: SDUPTHER

## 2021-05-03 NOTE — TELEPHONE ENCOUNTER
----- Message from Lisa Bridges sent at 5/3/2021 11:58 AM EDT -----  Patient stated her insurance is no longer covering the estradiol, she is asking if vagifem can be sent to the [facility]?

## 2021-05-21 ENCOUNTER — HOSPITAL ENCOUNTER (OUTPATIENT)
Dept: MAMMOGRAPHY | Facility: HOSPITAL | Age: 51
Discharge: HOME | End: 2021-05-21
Admitting: PHYSICIAN ASSISTANT

## 2021-05-21 DIAGNOSIS — Z12.31 ENCOUNTER FOR SCREENING MAMMOGRAM FOR MALIGNANT NEOPLASM OF BREAST: ICD-10-CM

## 2021-05-21 PROCEDURE — 77067 SCR MAMMO BI INCL CAD: CPT

## 2021-05-21 PROCEDURE — 77063 BREAST TOMOSYNTHESIS BI: CPT

## 2021-06-29 ENCOUNTER — HOSPITAL ENCOUNTER (OUTPATIENT)
Dept: GENERAL RADIOLOGY | Facility: HOSPITAL | Age: 51
Discharge: HOME | End: 2021-06-29
Admitting: NURSE PRACTITIONER

## 2021-06-29 ENCOUNTER — TRANSCRIBE ORDERS (OUTPATIENT)
Dept: GENERAL RADIOLOGY | Facility: HOSPITAL | Age: 51
End: 2021-06-29

## 2021-06-29 DIAGNOSIS — Z91.81 PERSONAL HISTORY OF FALL: ICD-10-CM

## 2021-06-29 DIAGNOSIS — M25.552 LEFT HIP PAIN: Primary | ICD-10-CM

## 2021-06-29 DIAGNOSIS — M25.552 LEFT HIP PAIN: ICD-10-CM

## 2021-06-29 PROCEDURE — 73502 X-RAY EXAM HIP UNI 2-3 VIEWS: CPT

## 2022-01-03 ENCOUNTER — OFFICE VISIT (OUTPATIENT)
Dept: OBSTETRICS AND GYNECOLOGY | Facility: CLINIC | Age: 52
End: 2022-01-03

## 2022-01-03 VITALS
BODY MASS INDEX: 37.93 KG/M2 | HEIGHT: 60 IN | DIASTOLIC BLOOD PRESSURE: 70 MMHG | SYSTOLIC BLOOD PRESSURE: 132 MMHG | WEIGHT: 193.2 LBS

## 2022-01-03 DIAGNOSIS — Z01.419 ENCOUNTER FOR GYNECOLOGICAL EXAMINATION WITHOUT ABNORMAL FINDING: Primary | ICD-10-CM

## 2022-01-03 PROCEDURE — 99396 PREV VISIT EST AGE 40-64: CPT | Performed by: PHYSICIAN ASSISTANT

## 2022-01-03 RX ORDER — CARVEDILOL 25 MG/1
25 TABLET ORAL 2 TIMES DAILY
COMMUNITY
Start: 2021-12-21

## 2022-01-03 RX ORDER — LEVOCETIRIZINE DIHYDROCHLORIDE 5 MG/1
5 TABLET, FILM COATED ORAL DAILY
COMMUNITY
Start: 2021-11-20

## 2022-01-03 RX ORDER — LEFLUNOMIDE 10 MG/1
TABLET ORAL
COMMUNITY
End: 2023-01-05

## 2022-01-03 RX ORDER — ESTRADIOL 10 UG/1
1 INSERT VAGINAL 2 TIMES WEEKLY
Qty: 24 TABLET | Refills: 4 | Status: SHIPPED | OUTPATIENT
Start: 2022-01-03 | End: 2023-01-05 | Stop reason: SDUPTHER

## 2022-01-03 RX ORDER — DULOXETIN HYDROCHLORIDE 60 MG/1
CAPSULE, DELAYED RELEASE ORAL
COMMUNITY
End: 2023-01-05

## 2022-01-03 RX ORDER — USTEKINUMAB 45 MG/.5ML
45 INJECTION, SOLUTION SUBCUTANEOUS
COMMUNITY

## 2022-01-03 NOTE — PROGRESS NOTES
Subjective   Chief Complaint   Patient presents with   • Gynecologic Exam     No pap today, last pap done 20-WNL, MMG done 21-WNL, No complaints       Dilia Mirza is a 51 y.o. year old  presenting to be seen for her annual gynecological exam.   She has no complaints or concerns  Desires refills on her Vagifem  Had a normal screening mammogram May 2021  Previous hysterectomy and bilateral salpingo-oophorectomy    Past Medical History:   Diagnosis Date   • Diabetes mellitus (HCC)    • Hypertension         Current Outpatient Medications:   •  Ascorbic Acid (VITAMIN C) 500 MG capsule, Vitamin C  1 daily, Disp: , Rfl:   •  atorvastatin (LIPITOR) 40 MG tablet, atorvastatin 40 mg tablet  Take 1 tablet every day by oral route., Disp: , Rfl:   •  Blood Glucose Monitoring Suppl (ONE TOUCH ULTRA MINI) w/Device kit, USE TO CHECK BLOOD GLUCOSE ONCE DAILY OR AS DIRECTED, Disp: , Rfl: 0  •  carvedilol (COREG) 25 MG tablet, Take 25 mg by mouth 2 (Two) Times a Day., Disp: , Rfl:   •  Cholecalciferol (VITAMIN D3) 1000 UNIT/SPRAY liquid, Vitamin D3  Daily, Disp: , Rfl:   •  DULoxetine (CYMBALTA) 60 MG capsule, duloxetine 60 mg capsule,delayed release  Take 1 capsule every day by oral route., Disp: , Rfl:   •  esomeprazole (nexIUM) 40 MG capsule, TAKE 1 CAPSULE BY MOUTH ONE TIME A DAY, Disp: , Rfl: 0  •  estradiol (Vagifem) 10 MCG tablet vaginal tablet, Insert 1 tablet into the vagina 2 (Two) Times a Week., Disp: 24 tablet, Rfl: 4  •  fluticasone (FLONASE) 50 MCG/ACT nasal spray, 2 sprays into each nostril Daily., Disp: , Rfl:   •  glucose blood test strip, OneTouch Ultra Blue Test Strip  1 time daily E11.8, Disp: , Rfl:   •  leflunomide (ARAVA) 10 MG tablet, leflunomide 10 mg tablet  Take 1 tablet every day by oral route., Disp: , Rfl:   •  levocetirizine (XYZAL) 5 MG tablet, Take 5 mg by mouth Daily., Disp: , Rfl:   •  metFORMIN ER (GLUCOPHAGE-XR) 500 MG 24 hr tablet, TAKE 2 TABLETS BY MOUTH TWO TIMES  "A DAY in the morning and evening, Disp: , Rfl: 2  •  ONE TOUCH ULTRA TEST test strip, , Disp: , Rfl:   •  quinapril (ACCUPRIL) 40 MG tablet, , Disp: , Rfl:   •  Trulicity 1.5 MG/0.5ML solution pen-injector, INJECT THE CONTENTS OF 1 SYRINGE (1.5MG) ONE TIME A WEEK AS DIRECTED., Disp: , Rfl:   •  ustekinumab (Stelara) 45 MG/0.5ML injection, Inject 45 mg under the skin into the appropriate area as directed Every 3 (Three) Months., Disp: , Rfl:   •  B Complex Vitamins (GNP VITAMIN B COMPLEX PO), vitamin B complex  1 daily, Disp: , Rfl:   •  Dermatological Products, Misc. (ALEVICYN ANTIPRURITIC EX), Alevicyn Antipruritic  prn, Disp: , Rfl:   •  Progesterone 40 % cream, , Disp: , Rfl:   •  Vitamin E 200 units tablet, vitamin E  Daily, Disp: , Rfl:    Allergies   Allergen Reactions   • Ceclor [Cefaclor] Anaphylaxis   • Tetracycline Hives   • Tetracyclines & Related Anaphylaxis   • Sudafed [Pseudoephedrine] Palpitations      Past Surgical History:   Procedure Laterality Date   • CYST REMOVAL      left axilla   • HAND SURGERY     • HYSTERECTOMY      2007   • KNEE ARTHROSCOPY W/ MENISCAL REPAIR     • OOPHORECTOMY     • TOTAL ABDOMINAL HYSTERECTOMY        Social History     Socioeconomic History   • Marital status:    Tobacco Use   • Smoking status: Never Smoker   • Smokeless tobacco: Never Used   Vaping Use   • Vaping Use: Never used   Substance and Sexual Activity   • Alcohol use: No   • Drug use: No   • Sexual activity: Yes     Partners: Male     Birth control/protection: Surgical      Family History   Problem Relation Age of Onset   • Diabetes Mother    • Hypertension Mother    • Diabetes Sister    • Hypertension Sister        Review of Systems   Constitutional: Negative for chills, diaphoresis and fever.   Gastrointestinal: Negative.    Genitourinary: Negative for difficulty urinating, dysuria and pelvic pain.           Objective   /70   Ht 152.4 cm (60\")   Wt 87.6 kg (193 lb 3.2 oz)   Breastfeeding No   " BMI 37.73 kg/m²     Physical Exam  Constitutional:       Appearance: Normal appearance. She is well-developed and well-groomed. She is morbidly obese.   Eyes:      General: Lids are normal.      Extraocular Movements: Extraocular movements intact.      Conjunctiva/sclera: Conjunctivae normal.   Chest:   Breasts: Breasts are symmetrical.      Right: No inverted nipple, mass, nipple discharge, skin change or tenderness.      Left: No inverted nipple, mass, nipple discharge, skin change or tenderness.       Abdominal:      Palpations: Abdomen is soft.      Tenderness: There is no abdominal tenderness. There is no guarding.   Genitourinary:     Labia:         Right: No rash, tenderness or lesion.         Left: No rash, tenderness or lesion.       Urethra: No prolapse, urethral pain, urethral swelling or urethral lesion.      Vagina: No vaginal discharge, tenderness or lesions.      Uterus: Absent.       Adnexa:         Right: No mass or tenderness.          Left: No mass or tenderness.     Skin:     General: Skin is warm and dry.      Findings: No bruising or lesion.   Neurological:      Mental Status: She is alert.   Psychiatric:         Attention and Perception: Attention normal.         Mood and Affect: Mood normal.         Speech: Speech normal.         Behavior: Behavior is cooperative.            Result Review :                   Assessment and Plan  Diagnoses and all orders for this visit:    1. Encounter for gynecological examination without abnormal finding (Primary)    Other orders  -     estradiol (Vagifem) 10 MCG tablet vaginal tablet; Insert 1 tablet into the vagina 2 (Two) Times a Week.  Dispense: 24 tablet; Refill: 4      Patient Instructions   Self breast exam monthly  Annual mammogram  Calcium 1200 mg daily and vit D 2000 mg daily  Regular excercise               This note was electronically signed.    Berta Ortega PA-C   January 3, 2022

## 2022-05-16 ENCOUNTER — TRANSCRIBE ORDERS (OUTPATIENT)
Dept: GENERAL RADIOLOGY | Facility: HOSPITAL | Age: 52
End: 2022-05-16

## 2022-05-16 ENCOUNTER — HOSPITAL ENCOUNTER (OUTPATIENT)
Dept: GENERAL RADIOLOGY | Facility: HOSPITAL | Age: 52
Discharge: HOME | End: 2022-05-16

## 2022-05-16 ENCOUNTER — LAB (OUTPATIENT)
Dept: LAB | Facility: HOSPITAL | Age: 52
End: 2022-05-16

## 2022-05-16 ENCOUNTER — TRANSCRIBE ORDERS (OUTPATIENT)
Dept: LAB | Facility: HOSPITAL | Age: 52
End: 2022-05-16

## 2022-05-16 DIAGNOSIS — J43.9 EMPHYSEMATOUS BLEB: Primary | ICD-10-CM

## 2022-05-16 DIAGNOSIS — L40.0 PSORIASIS VULGARIS: Primary | ICD-10-CM

## 2022-05-16 DIAGNOSIS — Z79.899 DRUG THERAPY: ICD-10-CM

## 2022-05-16 DIAGNOSIS — L40.0 PSORIASIS VULGARIS: ICD-10-CM

## 2022-05-16 DIAGNOSIS — L40.1 PUSTULAR PSORIASIS: ICD-10-CM

## 2022-05-16 DIAGNOSIS — J43.9 EMPHYSEMATOUS BLEB: ICD-10-CM

## 2022-05-16 PROCEDURE — 86480 TB TEST CELL IMMUN MEASURE: CPT

## 2022-05-16 PROCEDURE — 36415 COLL VENOUS BLD VENIPUNCTURE: CPT

## 2022-05-16 PROCEDURE — 71046 X-RAY EXAM CHEST 2 VIEWS: CPT

## 2022-05-18 LAB
GAMMA INTERFERON BACKGROUND BLD IA-ACNC: 0.01 IU/ML
M TB IFN-G BLD-IMP: NEGATIVE
M TB IFN-G CD4+ BCKGRND COR BLD-ACNC: 0.02 IU/ML
M TB IFN-G CD4+CD8+ BCKGRND COR BLD-ACNC: 0.01 IU/ML
MITOGEN IGNF BLD-ACNC: >10 IU/ML
QUANTIFERON INCUBATION: NORMAL
SERVICE CMNT-IMP: NORMAL

## 2023-01-05 ENCOUNTER — OFFICE VISIT (OUTPATIENT)
Dept: OBSTETRICS AND GYNECOLOGY | Facility: CLINIC | Age: 53
End: 2023-01-05
Payer: COMMERCIAL

## 2023-01-05 VITALS
DIASTOLIC BLOOD PRESSURE: 70 MMHG | WEIGHT: 185.4 LBS | HEIGHT: 60 IN | BODY MASS INDEX: 36.4 KG/M2 | SYSTOLIC BLOOD PRESSURE: 106 MMHG

## 2023-01-05 DIAGNOSIS — Z12.72 VAGINAL PAP SMEAR: ICD-10-CM

## 2023-01-05 DIAGNOSIS — Z01.419 WELL WOMAN EXAM WITH ROUTINE GYNECOLOGICAL EXAM: Primary | ICD-10-CM

## 2023-01-05 PROCEDURE — 99396 PREV VISIT EST AGE 40-64: CPT | Performed by: PHYSICIAN ASSISTANT

## 2023-01-05 RX ORDER — ESTRADIOL 10 UG/1
1 INSERT VAGINAL 3 TIMES WEEKLY
Qty: 36 TABLET | Refills: 4 | Status: SHIPPED | OUTPATIENT
Start: 2023-01-05

## 2023-01-05 RX ORDER — DAPAGLIFLOZIN 10 MG/1
1 TABLET, FILM COATED ORAL DAILY
COMMUNITY
Start: 2022-12-08

## 2023-01-05 RX ORDER — LISINOPRIL 40 MG/1
40 TABLET ORAL DAILY
COMMUNITY
Start: 2022-12-09

## 2023-01-05 NOTE — PROGRESS NOTES
Subjective   Chief Complaint   Patient presents with   • Gynecologic Exam     Last pap done 20-WNL, MMG is due, C/O occasional vaginal pain       Dilia BossTiffanieMichael is a 52 y.o. year old  presenting to be seen for her annual gynecological exam.   Has no complaints except occasional pain/dryness. Using vagifem twice weekly. Prefers to stick with that over a vaginal cream  Last mammogram was may 2021  Previous hysterectomy [facility]SO    Past Medical History:   Diagnosis Date   • Diabetes mellitus (HCC)    • Fibroid    • Hyperlipidemia    • Hypertension    • Migraine    • Ovarian cyst    • Pelvic prolapse     Possible now   • Polycystic ovary syndrome    • Varicella     Mild case        Current Outpatient Medications:   •  Ascorbic Acid (VITAMIN C) 500 MG capsule, Vitamin C  1 daily, Disp: , Rfl:   •  atorvastatin (LIPITOR) 40 MG tablet, atorvastatin 40 mg tablet  Take 1 tablet every day by oral route., Disp: , Rfl:   •  B Complex Vitamins (GNP VITAMIN B COMPLEX PO), vitamin B complex  1 daily, Disp: , Rfl:   •  Blood Glucose Monitoring Suppl (ONE TOUCH ULTRA MINI) w/Device kit, USE TO CHECK BLOOD GLUCOSE ONCE DAILY OR AS DIRECTED, Disp: , Rfl: 0  •  carvedilol (COREG) 25 MG tablet, Take 25 mg by mouth 2 (Two) Times a Day., Disp: , Rfl:   •  Cholecalciferol (VITAMIN D3) 1000 UNIT/SPRAY liquid, Vitamin D3  Daily, Disp: , Rfl:   •  Dermatological Products, Misc. (ALEVICYN ANTIPRURITIC EX), Alevicyn Antipruritic  prn, Disp: , Rfl:   •  esomeprazole (nexIUM) 40 MG capsule, TAKE 1 CAPSULE BY MOUTH ONE TIME A DAY, Disp: , Rfl: 0  •  estradiol (Vagifem) 10 MCG tablet vaginal tablet, Insert 1 tablet into the vagina 3 (Three) Times a Week., Disp: 36 tablet, Rfl: 4  •  Farxiga 10 MG tablet, Take 1 tablet by mouth Daily., Disp: , Rfl:   •  fluticasone (FLONASE) 50 MCG/ACT nasal spray, 2 sprays into each nostril Daily., Disp: , Rfl:   •  glucose blood test strip, OneTouch Ultra Blue Test Strip  1  time daily E11.8, Disp: , Rfl:   •  levocetirizine (XYZAL) 5 MG tablet, Take 5 mg by mouth Daily., Disp: , Rfl:   •  lisinopril (PRINIVIL,ZESTRIL) 40 MG tablet, Take 40 mg by mouth Daily., Disp: , Rfl:   •  metFORMIN ER (GLUCOPHAGE-XR) 500 MG 24 hr tablet, TAKE 2 TABLETS BY MOUTH TWO TIMES A DAY in the morning and evening, Disp: , Rfl: 2  •  ONE TOUCH ULTRA TEST test strip, , Disp: , Rfl:   •  Progesterone 40 % cream, , Disp: , Rfl:   •  Trulicity 1.5 MG/0.5ML solution pen-injector, INJECT THE CONTENTS OF 1 SYRINGE (1.5MG) ONE TIME A WEEK AS DIRECTED., Disp: , Rfl:   •  ustekinumab (Stelara) 45 MG/0.5ML injection, Inject 45 mg under the skin into the appropriate area as directed Every 3 (Three) Months., Disp: , Rfl:   •  Vitamin E 200 units tablet, vitamin E  Daily, Disp: , Rfl:    Allergies   Allergen Reactions   • Ceclor [Cefaclor] Anaphylaxis   • Tetracycline Hives   • Tetracyclines & Related Anaphylaxis   • Sudafed [Pseudoephedrine] Palpitations      Past Surgical History:   Procedure Laterality Date   • CYST REMOVAL      left axilla   • HAND SURGERY     • HYSTERECTOMY      2007   • KNEE ARTHROSCOPY W/ MENISCAL REPAIR     • MYOMECTOMY ABDOMINAL APPROACH  2000   • OOPHORECTOMY     • TOTAL ABDOMINAL HYSTERECTOMY     • TOTAL ABDOMINAL HYSTERECTOMY WITH SALPINGO OOPHORECTOMY  2007   • WISDOM TOOTH EXTRACTION      Unsure of date. After 2008      Social History     Socioeconomic History   • Marital status:    Tobacco Use   • Smoking status: Never   • Smokeless tobacco: Never   Vaping Use   • Vaping Use: Never used   Substance and Sexual Activity   • Alcohol use: Yes     Comment: Rarely. Maybe 2 drinks a month.   • Drug use: No   • Sexual activity: Not Currently     Partners: Male     Birth control/protection: Surgical, Hysterectomy      Family History   Problem Relation Age of Onset   • Diabetes Mother         Type 2   • Hypertension Mother    • Diabetes Sister    • Hypertension Sister    • Diabetes Brother          Type 2       Review of Systems   Constitutional: Negative for chills, diaphoresis and fever.   Gastrointestinal: Negative.    Genitourinary: Positive for vaginal pain. Negative for difficulty urinating, dysuria, pelvic pain, vaginal bleeding and vaginal discharge.           Objective   /70   Ht 152.4 cm (60\")   Wt 84.1 kg (185 lb 6.4 oz)   BMI 36.21 kg/m²     Physical Exam  Exam conducted with a chaperone present.   Constitutional:       Appearance: Normal appearance. She is well-groomed.   Eyes:      General: Lids are normal.      Extraocular Movements: Extraocular movements intact.      Conjunctiva/sclera: Conjunctivae normal.   Chest:   Breasts:     Breasts are symmetrical.      Right: No inverted nipple, mass, nipple discharge, skin change or tenderness.      Left: No inverted nipple, mass, nipple discharge, skin change or tenderness.   Abdominal:      General: There is no distension.      Palpations: Abdomen is soft.      Tenderness: There is no abdominal tenderness.   Genitourinary:     Labia:         Right: No rash, tenderness or lesion.         Left: No rash, tenderness or lesion.       Urethra: No prolapse, urethral pain, urethral swelling or urethral lesion.      Vagina: No vaginal discharge, tenderness or lesions.      Uterus: Absent.       Adnexa:         Right: No mass or tenderness.          Left: No mass or tenderness.     Lymphadenopathy:      Upper Body:      Right upper body: No axillary adenopathy.      Left upper body: No axillary adenopathy.   Neurological:      General: No focal deficit present.      Mental Status: She is alert and oriented to person, place, and time.   Psychiatric:         Attention and Perception: Attention normal.         Mood and Affect: Mood normal.         Speech: Speech normal.         Behavior: Behavior is cooperative.            Result Review :                   Assessment and Plan  Diagnoses and all orders for this visit:    1. Well woman exam with  routine gynecological exam (Primary)    2. Vaginal Pap smear  -     LIQUID-BASED PAP SMEAR, [facility] ([facility])    Other orders  -     estradiol (Vagifem) 10 MCG tablet vaginal tablet; Insert 1 tablet into the vagina 3 (Three) Times a Week.  Dispense: 36 tablet; Refill: 4      Patient Instructions   Self breast exam monthly  Annual mammogram                This note was electronically signed.    Berta Ortega PA-C   January 5, 2023

## 2023-01-12 LAB — REF LAB TEST METHOD: NORMAL

## 2024-06-28 ENCOUNTER — TRANSCRIBE ORDERS (OUTPATIENT)
Dept: ADMINISTRATIVE | Facility: HOSPITAL | Age: 54
End: 2024-06-28
Payer: COMMERCIAL

## 2024-06-28 DIAGNOSIS — Z12.31 SCREENING MAMMOGRAM FOR BREAST CANCER: Primary | ICD-10-CM

## 2024-06-28 DIAGNOSIS — M79.622 PAIN IN LEFT AXILLA: Primary | ICD-10-CM

## 2024-07-31 ENCOUNTER — HOSPITAL ENCOUNTER (OUTPATIENT)
Dept: ULTRASOUND IMAGING | Facility: HOSPITAL | Age: 54
Discharge: HOME | End: 2024-07-31
Payer: COMMERCIAL

## 2024-07-31 ENCOUNTER — HOSPITAL ENCOUNTER (OUTPATIENT)
Dept: MAMMOGRAPHY | Facility: HOSPITAL | Age: 54
Discharge: HOME | End: 2024-07-31
Payer: COMMERCIAL

## 2024-07-31 DIAGNOSIS — M79.622 PAIN IN LEFT AXILLA: ICD-10-CM

## 2024-07-31 PROCEDURE — 76882 US LMTD JT/FCL EVL NVASC XTR: CPT

## 2024-07-31 PROCEDURE — G0279 TOMOSYNTHESIS, MAMMO: HCPCS

## 2024-07-31 PROCEDURE — 77066 DX MAMMO INCL CAD BI: CPT

## 2025-06-30 ENCOUNTER — TRANSCRIBE ORDERS (OUTPATIENT)
Dept: ADMINISTRATIVE | Facility: HOSPITAL | Age: 55
End: 2025-06-30
Payer: COMMERCIAL

## 2025-06-30 DIAGNOSIS — Z12.31 ENCOUNTER FOR SCREENING MAMMOGRAM FOR BREAST CANCER: Primary | ICD-10-CM
